# Patient Record
Sex: FEMALE | Race: OTHER
[De-identification: names, ages, dates, MRNs, and addresses within clinical notes are randomized per-mention and may not be internally consistent; named-entity substitution may affect disease eponyms.]

---

## 2018-11-06 ENCOUNTER — HOSPITAL ENCOUNTER (OUTPATIENT)
Dept: HOSPITAL 61 - SURG | Age: 58
Discharge: HOME | End: 2018-11-06
Attending: INTERNAL MEDICINE
Payer: COMMERCIAL

## 2018-11-06 VITALS — SYSTOLIC BLOOD PRESSURE: 114 MMHG | DIASTOLIC BLOOD PRESSURE: 75 MMHG

## 2018-11-06 DIAGNOSIS — G20: ICD-10-CM

## 2018-11-06 DIAGNOSIS — K63.89: ICD-10-CM

## 2018-11-06 DIAGNOSIS — Z72.0: ICD-10-CM

## 2018-11-06 DIAGNOSIS — K29.50: ICD-10-CM

## 2018-11-06 DIAGNOSIS — Z79.899: ICD-10-CM

## 2018-11-06 DIAGNOSIS — K21.9: ICD-10-CM

## 2018-11-06 DIAGNOSIS — B96.81: ICD-10-CM

## 2018-11-06 DIAGNOSIS — K31.89: ICD-10-CM

## 2018-11-06 DIAGNOSIS — R63.4: ICD-10-CM

## 2018-11-06 DIAGNOSIS — Z82.49: ICD-10-CM

## 2018-11-06 DIAGNOSIS — I10: ICD-10-CM

## 2018-11-06 DIAGNOSIS — K59.01: Primary | ICD-10-CM

## 2018-11-06 PROCEDURE — 88342 IMHCHEM/IMCYTCHM 1ST ANTB: CPT

## 2018-11-06 PROCEDURE — 45378 DIAGNOSTIC COLONOSCOPY: CPT

## 2018-11-06 PROCEDURE — 88305 TISSUE EXAM BY PATHOLOGIST: CPT

## 2018-11-06 PROCEDURE — 43239 EGD BIOPSY SINGLE/MULTIPLE: CPT

## 2018-11-06 NOTE — PDOC1
HISTORY & PHYSICAL


H&P


Héctor Tomlinson    [birth year] 10:00 AM  


 [facility], [facility]


 OUR PATIENTS COME FIRST


 [address] [place] [address]. [phone]     








Patient:      Héctor Tomlinson


YOB: 1960   


Date:                         10/24/2018 10:00 AM 


Visit Type:                 Consult











This 58 year old female presents for weight loss and constipation.








History of Present Illness:


1.  weight loss 











Héctor Tomlinson is a 58 year old female who presents for evaluation of weight 

loss. There is weight loss. The onset was gradual. The severity is mild. Weight 

lost is 8 lbs. Weight loss contributing factors include Has poor appetite. 

Works two or three jobs.. She has had no prior studies.




















Héctor Tomlinson is a 58 year old female who presents for evaluation of 

constipation. The problem is ongoing. The severity is mild. Has infrequent 

bowel movements and has to strain and it was thought to be due to Parkinson's 

disease. The pertinent history includes: neuromuscular disease. She has had no 

prior studies.





























INTAKE COMMENTS:


Intake Comments: patient states she is here due to weight loss





PROBLEM LIST:   











Problem Description Onset Date Chronic Clinical Status Notes


 


Malaise and fatigue 2014 Y  Mapped from [facility] Chronic Conditions table on  by the ICD9 to SNOMED Bulk Mapping Utility. The mapped diagnosis code 

was Fatigue, 780.79, added by Isreal Hopkins, with responsible provider Isreal Hopkins MD.  Onset date 2014; last addressed on 2014.


 


Gastroesophageal reflux disease 2014 Y  Mapped from [facility] Chronic 

Conditions table on 2014 by the ICD9 to SNOMED Bulk Mapping Utility. The 

mapped diagnosis code was GERD (gastroesophageal reflux disease), 530.81, added 

by Isreal Hopkins, with responsible provider Isreal Hopkins MD.  Onset date 2014; last addressed on 2014.


 


Backache 2014 Y  Mapped from [facility] Chronic Conditions table on 2014 

by the ICD9 to SNOM[employer] Bulk Mapping Utility. The mapped diagnosis code was Back 

pain, 724.5, added by Isreal Hopkins, with responsible provider Isreal Hopkins MD.

  Onset date 2014; last addressed on 2014.


 


Pain in limb 2014 Y  Mapped from [facility] Chronic Conditions table on 2014 by the ICD9 to SNOM[employer] Bulk Mapping Utility. The mapped diagnosis code was 

Left leg pain, 729.5, added by Isreal Hopkins, with responsible provider Isreal Hopkins MD.  Onset date 2014; last addressed on 2014.


 


Constipation 2014 Y  Mapped from [facility] Chronic Conditions table on 2014 by the ICD9 to SNOM[employer] Bulk Mapping Utility. The mapped diagnosis code was 

Constipation, 564.00, added by Isreal Hopkins, with responsible provider Isreal Hopkins MD.  Onset date 2014; last addressed on 2014.


 


Allergic rhinitis 2015   


 


Essential hypertension 2016   


 


Impacted cerumen of both ears 2015   


 


Dyspnea 2014 Y  Mapped from [facility] Chronic Conditions table on 2014 by 

Carol Romano. The mapped diagnosis code was Dyspnea,786.09, added by 

Isreal Hopkins , with responsible provider Isreal Hopkins MD.  Onset date 2014; last addressed on 2014.




















PAST MEDICAL/SURGICAL HISTORY   (Detailed)











Disease/disorder Onset Date Management Date Comments


 


Hypertension    














Family History  (Detailed)











Relationship Family Member Name  Age at Death Condition Onset Age Cause 

of Death


 


Father  N  Coronary artery disease 48 N














Social History:  (Detailed)


The patient is right-handed.  


Preferred language is English.  


 The patient does not need an .  


MARITAL STATUS/FAMILY/SOCIAL SUPPORT


Currently .  


CHILDREN


Has children:  1 son(s).  1 daughter(s).


HOME ENVIRONMENT


Housing status is stable/permanent. 


Tobacco use status: Never smoked tobacco.


Smoking status: Never smoker.





TOBACCO CESSATION INFORMATION











Date Counseled By Order Status Description Code Tobacco Cessation Information


 


2014 [place] Tobacco cessation counseling completed   Tobacco 

cessation counseling








ALCOHOL


There is no history of alcohol use. 


CAFFEINE


The patient uses caffeine: tea.


 EXPERIENCE


Patient has no  experience.

















Medications (active prior to today)











Medication Name Sig Description Start Date Stop Date Refilled Rx Elsewhere


 


Women's One Daily 18 mg iron-400 mcg-500 mg Ca tablet take 1 tab daily 2014   N


 


Senna-S 8.6 mg-50 mg tablet take 2 tablet by oral route  every day 2016   

N


 


tizanidine 4 mg tablet take 1 tablet by oral route twice a day as needed 2018   N


 


omeprazole 20 mg capsule,delayed release take 1 capsule (20MG)  by ORAL route  

every day before breakfast 10/16/2018   N








Medication Reconciliation


Medications reconciled today.


Medication Reviewed











Adherence Medication Name Sig Desc Elsewhere Status


 


taking as directed Women's One Daily 18 mg iron-400 mcg-500 mg Ca tablet take 1 

tab daily N Verified


 


taking as directed Senna-S 8.6 mg-50 mg tablet take 2 tablet by oral route  

every day N Verified


 


taking as directed tizanidine 4 mg tablet take 1 tablet by oral route twice a 

day as needed N Verified


 


taking as directed omeprazole 20 mg capsule,delayed release take 1 capsule (20MG

)  by ORAL route  every day before breakfast N Verified


 


taking as directed cyanocobalamin (vit B-12) 1,000 mcg/mL injection solution 

inject 1 milliliter by intramuscular route 4 times every month for anemia N 

Verified








Medications (Added, Continued or Stopped today)











Start Date Medication Directions PRN Status PRN Reason Instruction Stop Date


 


10/16/2018 omeprazole 20 mg capsule,delayed release take 1 capsule (20MG)  by 

ORAL route  every day before breakfast N   


 


2016 Senna-S 8.6 mg-50 mg tablet take 2 tablet by oral route  every day N

   


 


2018 tizanidine 4 mg tablet take 1 tablet by oral route twice a day as 

needed N   


 


2014 Women's One Daily 18 mg iron-400 mcg-500 mg Ca tablet take 1 tab 

daily N   








Allergies:











Ingredient Reaction (Severity) Medication Name Comment


 


NO KNOWN ALLERGIES   














Review of Systems











System Neg/Pos Details


 


Constitutional Negative Chills, Fever and Malaise.


 


ENMT Negative Sore throat.


 


Eyes Negative Double vision.


 


Respiratory Negative Dyspnea and Wheezing.


 


Cardio Negative Chest pain and Irregular heartbeat/palpitations.


 


GI Positive See HPI.


 


GI Negative See HPI.


 


 Negative Dysuria and Hematuria.


 


Endocrine Negative Cold intolerance and Heat intolerance.


 


Psych Negative Anxiety.


 


Integumentary Negative Hives and Rash.


 


MS Negative Joint pain.


 


Hema/Lymph Negative Easy bleeding and Easy bruising.


 


Allergic/Immuno Negative Food allergies.














Vital Signs














Time BP mm/Hg Pulse /min Resp /min Temp F Ht ft Ht in Ht cm Wt lb Wt kg BMI kg/

m2 BSA m2 O2 Sat%


 


10:00 /80 87 12 97.7 5.0 3.00 160.02 124.40 56.427 22.04 1.58 99








Measured By











Time Measured by


 


10:00 AM Madison Swygert











PHYSICAL EXAM:











Exam Findings Details


 


Constitutional Normal Well developed.


 


Eyes Normal Conjunctiva - Right: Normal, Left: Normal. Sclera - Right: Normal, 

Left: Normal.


 


Nasopharynx Normal Lips/teeth/gums - Normal.


 


Neck Exam Normal Inspection - Normal. Thyroid gland - Normal.


 


Respiratory Normal Inspection - Normal. Auscultation - Normal.


 


Cardiovascular Normal Regular rate and rhythm. No murmurs, gallops, or rubs.


 


Abdomen Normal Inspection - Normal. Anterior palpation - No guarding. No 

abdominal tenderness. No hepatic enlargement. No spleen enlargement. No hernia. 

No Ascites.


 


Skin Normal Inspection - Normal.


 


Extremity Normal No edema.


 


Psychiatric Normal Orientation - Oriented to time, place, person & situation. 

Appropriate mood and affect.











Assessment/Plan











# Detail Type Description


 


 1. Assessment Weight loss (R63.4).


 


 Patient Plan schedule EGD at [facility]. Has loss of appetite.


 


 Plan Orders Further diagnostic evaluations ordered today include(s) EGD to be 

performed today.


 


  


 


 2. Assessment Slow transit constipation (K59.01).


 


 Patient Plan Await colonoscopy result.


 


  


 


 3. Assessment Encounter for screening colonoscopy (Z12.11).


 


 Patient Plan  schedule colonoscopy at [facility]


 


 Plan Orders Further diagnostic evaluations ordered today include(s) Colonoscopy

, flexible; diagnostic to be performed today.


 


  























Co-Sign Orders











Order Ordering Provider Cosigned Name Cosigned Date Cosigner Comments


 


EGD Abdulaziz Hopkins 10/24/2018 


 


Colonoscopy, flexible; diagnostic Abdulaziz Hopkins 10/24/2018 








Active Patient Care Team Members











Name Contact Agency Type Support Role Relationship Active Date Inactive Date 

Specialty


 


Isreal Hopkins MD   Patient provider PCP   Internal Med











Document Electronically signed:  Abdulaziz Hopkins MD   10/24/2018 01:33 PM


Document generated by: Abdulaziz Hopkins 10/24/2018


                      Katie Bolden MD, Family Practice;  Sly Pena MD 

Internal Medicine; Alex Hendrix MD, Internal Medicine; 


 Isreal Hopkins MD Internal Medicine; Abdulaziz Hopkins MD, Gastroenterology; 


  Ayo Oh MD, Rheumatology,  Susan MCCRACKEN








--------------------------------------------------------------------------------

--------------------------------------------------------------------------------

------














18





Patient seen and examined. No change in H&P.











ABDULAZIZ HOPKINS MD 2018 10:15

## 2018-11-07 NOTE — PATHOLOGY
[facility] Accession Number: [id number]

.                                                                01

Material submitted:                                        .

GASTRIC BODY BIOPSY

.                                                                01

Clinical history:                                          .

Weight loss

.                                                                02

**********************************************************************

Diagnosis:

Gastric biopsies, gastric body:

- Superficial active chronic gastritis, moderate, with Helicobacter

organisms identified.

.

(JPM:wan; 11/07/2018)

MBR/11/07/2018

**********************************************************************

.                                                                02

Comment:

Sections of the gastric biopsy reveal segments of gastric body mucosa

showing superficial moderate active chronic inflammation.  A properly

controlled immunoperoxidase stain for Helicobacter reveals focally

prominent numbers of Helicobacter organisms.  There is no evidence of

malignancy.

.

Special stain performed:  Immunoperoxidase stain for Helicobacter.

.

(JPM:; 11/07/2018)

.                                                                02

Electronically signed:                                     .

Mahendra Casanova MD, Pathologist

NPI- [id number]

.                                                                01

Gross description:                                         .

Received in formalin labeled "Héctor Tomlinson, gastric body BX," are 2

segments of tan soft tissue measuring 0.9 x 0.5 x 0.3 cm in aggregate

dimensions and ranging from 0.5 to 0.7 cm in maximum dimension.  The

specimen is submitted entirely in cassette A1.

(TSD; 11/6/2018)

TOB/TOB

.                                                                02

Pathologist provided ICD-10:

K29.30, B96.81

.                                                                02

CPT                                                        .

[id number], [id number]

Specimen Comment: A courtesy copy of this report has been sent to

Specimen Comment: [phone].

Specimen Comment: Report sent to 

***Performed at:  01

   [facility]

   [address], [place], [place]  [id number]

   MD éPrez Ceja MD Phone:  [phone]

***Performed at:  02

   [facility]

   [address], [place]  [id number]

   MD Mahendra Casanova MD Phone:  [phone]